# Patient Record
Sex: FEMALE | Race: WHITE | NOT HISPANIC OR LATINO | Employment: OTHER | URBAN - METROPOLITAN AREA
[De-identification: names, ages, dates, MRNs, and addresses within clinical notes are randomized per-mention and may not be internally consistent; named-entity substitution may affect disease eponyms.]

---

## 2019-11-22 ENCOUNTER — TRANSCRIBE ORDERS (OUTPATIENT)
Dept: ADMINISTRATIVE | Facility: HOSPITAL | Age: 69
End: 2019-11-22

## 2019-11-22 DIAGNOSIS — G56.03 BILATERAL CARPAL TUNNEL SYNDROME: Primary | ICD-10-CM

## 2019-12-20 ENCOUNTER — HOSPITAL ENCOUNTER (OUTPATIENT)
Dept: INFUSION THERAPY | Facility: HOSPITAL | Age: 69
Discharge: HOME | End: 2019-12-20
Admitting: PHYSICAL MEDICINE & REHABILITATION

## 2019-12-20 DIAGNOSIS — G56.03 BILATERAL CARPAL TUNNEL SYNDROME: ICD-10-CM

## 2019-12-20 PROCEDURE — 95909 NRV CNDJ TST 5-6 STUDIES: CPT

## 2019-12-20 PROCEDURE — 95886 MUSC TEST DONE W/N TEST COMP: CPT

## 2019-12-20 NOTE — PROCEDURES
HISTORY:        The patient is a 69-year-old ambidextrous female who presents with a complaint of bilateral hand pain.  She states that she has been experiencing the symptoms for 1 year.    The patient is being evaluated for carpal tunnel syndrome.            I.  NERVE CONDUCTION STUDIES:       The distal latency of the right median motor nerve is 4.0 ms.  The amplitude of evoked response is 4.5 mV.  The conduction velocity is 50 m/sec.       The distal latency of the right median sensory nerve at 14 cm is 3.1 ms. The amplitude of evoked response is 17 microvolts.       The distal latency of the right median sensory nerve at 10 cm is 2.8 ms; the distal latency of the right radial sensory nerve at 10 cm is 2.4 ms (normal difference less than or equal to 0.4 ms).       The distal latency of the left median motor nerve is 4.0 ms.  The amplitude of evoked response is 4.0 mV.  The conduction velocity is 50 m/sec.       The distal latency of left median sensory nerve at 14 cm is 3.2 ms.  The amplitude of evoked response is 18 microvolts.       The distal latency of the left median sensory at 10 cm is 2.6 ms; the distal latency of the left radial sensory nerve at 10 cm is 2.2 ms (normal difference less than or equal to 0.4 ms).       II.  ELECTROMYOGRAPHY:       Electromyography was performed on the following muscles of the right upper extremity using a monopolar needle: Deltoid, biceps, brachioradialis, flexor carpi radialis, flexor carpi ulnaris, and abductor pollicis brevis.      There were no changes in insertional activity. There were no denervation potentials present.      The motor unit action potentials were of normal height and duration. The recruitment patterns were normal.       III.  IMPRESSION:       1. Normal nerve conduction studies.    2. Normal electromyographic examination.    3. Normal study.  There is no electrophysiologic evidence of a neuropathic process.      Valeria Spencer,  MD  12/20/2019